# Patient Record
Sex: MALE | Employment: FULL TIME | URBAN - METROPOLITAN AREA
[De-identification: names, ages, dates, MRNs, and addresses within clinical notes are randomized per-mention and may not be internally consistent; named-entity substitution may affect disease eponyms.]

---

## 2018-07-14 ENCOUNTER — APPOINTMENT (OUTPATIENT)
Dept: MRI IMAGING | Age: 63
End: 2018-07-14
Attending: HOSPITALIST
Payer: COMMERCIAL

## 2018-07-14 ENCOUNTER — APPOINTMENT (OUTPATIENT)
Dept: CT IMAGING | Age: 63
End: 2018-07-14
Attending: EMERGENCY MEDICINE
Payer: COMMERCIAL

## 2018-07-14 ENCOUNTER — APPOINTMENT (OUTPATIENT)
Dept: GENERAL RADIOLOGY | Age: 63
End: 2018-07-14
Attending: EMERGENCY MEDICINE
Payer: COMMERCIAL

## 2018-07-14 ENCOUNTER — HOSPITAL ENCOUNTER (OUTPATIENT)
Age: 63
Setting detail: OBSERVATION
Discharge: HOME | End: 2018-07-15
Attending: EMERGENCY MEDICINE | Admitting: HOSPITALIST
Payer: COMMERCIAL

## 2018-07-14 DIAGNOSIS — H53.2 DOUBLE VISION: Primary | ICD-10-CM

## 2018-07-14 PROBLEM — I10 HYPERTENSION: Status: ACTIVE | Noted: 2018-07-14

## 2018-07-14 PROBLEM — Z85.6 HISTORY OF ACUTE MYELOID LEUKEMIA IN REMISSION: Status: ACTIVE | Noted: 2018-07-14

## 2018-07-14 PROBLEM — E78.5 HYPERLIPIDEMIA: Status: ACTIVE | Noted: 2018-07-14

## 2018-07-14 LAB
ABO + RH BLD: NORMAL
ALBUMIN SERPL-MCNC: 3.9 G/DL (ref 3.4–5)
ALBUMIN/GLOB SERPL: 1.1 {RATIO} (ref 0.8–1.7)
ALP SERPL-CCNC: 54 U/L (ref 45–117)
ALT SERPL-CCNC: 25 U/L (ref 16–61)
AMPHET UR QL SCN: NEGATIVE
ANION GAP SERPL CALC-SCNC: 9 MMOL/L (ref 3–18)
APPEARANCE UR: CLEAR
ASPIRIN TEST, ASPIRN: 627 ARU (ref 620–672)
AST SERPL-CCNC: 17 U/L (ref 15–37)
BARBITURATES UR QL SCN: NEGATIVE
BENZODIAZ UR QL: NEGATIVE
BILIRUB SERPL-MCNC: 0.6 MG/DL (ref 0.2–1)
BILIRUB UR QL: NEGATIVE
BLOOD GROUP ANTIBODIES SERPL: NORMAL
BUN SERPL-MCNC: 18 MG/DL (ref 7–18)
BUN/CREAT SERPL: 19 (ref 12–20)
CALCIUM SERPL-MCNC: 8.7 MG/DL (ref 8.5–10.1)
CANNABINOIDS UR QL SCN: NEGATIVE
CHLORIDE SERPL-SCNC: 105 MMOL/L (ref 100–108)
CHOLEST SERPL-MCNC: 312 MG/DL
CO2 SERPL-SCNC: 24 MMOL/L (ref 21–32)
COCAINE UR QL SCN: NEGATIVE
COLOR UR: YELLOW
CREAT SERPL-MCNC: 0.93 MG/DL (ref 0.6–1.3)
ERYTHROCYTE [DISTWIDTH] IN BLOOD BY AUTOMATED COUNT: 12.6 % (ref 11.6–14.5)
ERYTHROCYTE [SEDIMENTATION RATE] IN BLOOD: 13 MM/HR (ref 0–20)
EST. AVERAGE GLUCOSE BLD GHB EST-MCNC: 114 MG/DL
FIBRINOGEN PPP-MCNC: 405 MG/DL (ref 210–451)
GLOBULIN SER CALC-MCNC: 3.7 G/DL (ref 2–4)
GLUCOSE BLD STRIP.AUTO-MCNC: 121 MG/DL (ref 70–110)
GLUCOSE BLD STRIP.AUTO-MCNC: 153 MG/DL (ref 70–110)
GLUCOSE SERPL-MCNC: 116 MG/DL (ref 74–99)
GLUCOSE UR STRIP.AUTO-MCNC: NEGATIVE MG/DL
HBA1C MFR BLD: 5.6 % (ref 4.2–5.6)
HCT VFR BLD AUTO: 45.6 % (ref 36–48)
HDLC SERPL-MCNC: 67 MG/DL (ref 40–60)
HDLC SERPL: 4.7 {RATIO} (ref 0–5)
HDSCOM,HDSCOM: NORMAL
HGB BLD-MCNC: 16 G/DL (ref 13–16)
HGB UR QL STRIP: NEGATIVE
INR PPP: 1 (ref 0.8–1.2)
KETONES UR QL STRIP.AUTO: 15 MG/DL
LDLC SERPL CALC-MCNC: 212.2 MG/DL (ref 0–100)
LEUKOCYTE ESTERASE UR QL STRIP.AUTO: NEGATIVE
LIPID PROFILE,FLP: ABNORMAL
MCH RBC QN AUTO: 29.5 PG (ref 24–34)
MCHC RBC AUTO-ENTMCNC: 35.1 G/DL (ref 31–37)
MCV RBC AUTO: 84 FL (ref 74–97)
METHADONE UR QL: NEGATIVE
NITRITE UR QL STRIP.AUTO: NEGATIVE
OPIATES UR QL: NEGATIVE
PCP UR QL: NEGATIVE
PH UR STRIP: 7.5 [PH] (ref 5–8)
PLATELET # BLD AUTO: 214 K/UL (ref 135–420)
PMV BLD AUTO: 9.3 FL (ref 9.2–11.8)
POTASSIUM SERPL-SCNC: 3.5 MMOL/L (ref 3.5–5.5)
PROT SERPL-MCNC: 7.6 G/DL (ref 6.4–8.2)
PROT UR STRIP-MCNC: NEGATIVE MG/DL
PROTHROMBIN TIME: 12.5 SEC (ref 11.5–15.2)
RBC # BLD AUTO: 5.43 M/UL (ref 4.7–5.5)
SODIUM SERPL-SCNC: 138 MMOL/L (ref 136–145)
SP GR UR REFRACTOMETRY: >1.03 (ref 1–1.03)
SPECIMEN EXP DATE BLD: NORMAL
T3FREE SERPL-MCNC: 2.6 PG/ML (ref 2.18–3.98)
T4 FREE SERPL-MCNC: 1 NG/DL (ref 0.7–1.5)
THROMBIN TIME: 15.4 SECS (ref 13.8–18.2)
TRIGL SERPL-MCNC: 164 MG/DL (ref ?–150)
TSH SERPL DL<=0.05 MIU/L-ACNC: 0.66 UIU/ML (ref 0.36–3.74)
UROBILINOGEN UR QL STRIP.AUTO: 1 EU/DL (ref 0.2–1)
VLDLC SERPL CALC-MCNC: 32.8 MG/DL
WBC # BLD AUTO: 9.6 K/UL (ref 4.6–13.2)

## 2018-07-14 PROCEDURE — 85027 COMPLETE CBC AUTOMATED: CPT | Performed by: EMERGENCY MEDICINE

## 2018-07-14 PROCEDURE — 86900 BLOOD TYPING SEROLOGIC ABO: CPT | Performed by: EMERGENCY MEDICINE

## 2018-07-14 PROCEDURE — 74011250637 HC RX REV CODE- 250/637: Performed by: EMERGENCY MEDICINE

## 2018-07-14 PROCEDURE — 70553 MRI BRAIN STEM W/O & W/DYE: CPT

## 2018-07-14 PROCEDURE — 80053 COMPREHEN METABOLIC PANEL: CPT | Performed by: EMERGENCY MEDICINE

## 2018-07-14 PROCEDURE — 85670 THROMBIN TIME PLASMA: CPT | Performed by: EMERGENCY MEDICINE

## 2018-07-14 PROCEDURE — 74011250636 HC RX REV CODE- 250/636: Performed by: HOSPITALIST

## 2018-07-14 PROCEDURE — 83036 HEMOGLOBIN GLYCOSYLATED A1C: CPT | Performed by: HOSPITALIST

## 2018-07-14 PROCEDURE — 96374 THER/PROPH/DIAG INJ IV PUSH: CPT

## 2018-07-14 PROCEDURE — 96372 THER/PROPH/DIAG INJ SC/IM: CPT

## 2018-07-14 PROCEDURE — 84443 ASSAY THYROID STIM HORMONE: CPT | Performed by: HOSPITALIST

## 2018-07-14 PROCEDURE — 74011250637 HC RX REV CODE- 250/637: Performed by: HOSPITALIST

## 2018-07-14 PROCEDURE — 74011636320 HC RX REV CODE- 636/320: Performed by: EMERGENCY MEDICINE

## 2018-07-14 PROCEDURE — 85610 PROTHROMBIN TIME: CPT | Performed by: EMERGENCY MEDICINE

## 2018-07-14 PROCEDURE — 82962 GLUCOSE BLOOD TEST: CPT

## 2018-07-14 PROCEDURE — 70450 CT HEAD/BRAIN W/O DYE: CPT

## 2018-07-14 PROCEDURE — 99218 HC RM OBSERVATION: CPT

## 2018-07-14 PROCEDURE — 81003 URINALYSIS AUTO W/O SCOPE: CPT | Performed by: EMERGENCY MEDICINE

## 2018-07-14 PROCEDURE — 74011250636 HC RX REV CODE- 250/636

## 2018-07-14 PROCEDURE — 71045 X-RAY EXAM CHEST 1 VIEW: CPT

## 2018-07-14 PROCEDURE — 85576 BLOOD PLATELET AGGREGATION: CPT | Performed by: EMERGENCY MEDICINE

## 2018-07-14 PROCEDURE — A9575 INJ GADOTERATE MEGLUMI 0.1ML: HCPCS | Performed by: HOSPITALIST

## 2018-07-14 PROCEDURE — 99285 EMERGENCY DEPT VISIT HI MDM: CPT

## 2018-07-14 PROCEDURE — 80061 LIPID PANEL: CPT | Performed by: HOSPITALIST

## 2018-07-14 PROCEDURE — 84439 ASSAY OF FREE THYROXINE: CPT | Performed by: HOSPITALIST

## 2018-07-14 PROCEDURE — 74011000258 HC RX REV CODE- 258: Performed by: EMERGENCY MEDICINE

## 2018-07-14 PROCEDURE — 85384 FIBRINOGEN ACTIVITY: CPT | Performed by: EMERGENCY MEDICINE

## 2018-07-14 PROCEDURE — 70498 CT ANGIOGRAPHY NECK: CPT

## 2018-07-14 PROCEDURE — 85652 RBC SED RATE AUTOMATED: CPT | Performed by: HOSPITALIST

## 2018-07-14 PROCEDURE — 84481 FREE ASSAY (FT-3): CPT | Performed by: HOSPITALIST

## 2018-07-14 PROCEDURE — 80307 DRUG TEST PRSMV CHEM ANLYZR: CPT | Performed by: EMERGENCY MEDICINE

## 2018-07-14 PROCEDURE — 93005 ELECTROCARDIOGRAM TRACING: CPT

## 2018-07-14 PROCEDURE — 86141 C-REACTIVE PROTEIN HS: CPT | Performed by: HOSPITALIST

## 2018-07-14 RX ORDER — LISINOPRIL 10 MG/1
TABLET ORAL DAILY
COMMUNITY

## 2018-07-14 RX ORDER — AMOXICILLIN 250 MG
2 CAPSULE ORAL
Status: DISCONTINUED | OUTPATIENT
Start: 2018-07-14 | End: 2018-07-15 | Stop reason: HOSPADM

## 2018-07-14 RX ORDER — ATORVASTATIN CALCIUM 20 MG/1
40 TABLET, FILM COATED ORAL
Status: DISCONTINUED | OUTPATIENT
Start: 2018-07-14 | End: 2018-07-15

## 2018-07-14 RX ORDER — ATORVASTATIN CALCIUM 40 MG/1
40 TABLET, FILM COATED ORAL
COMMUNITY
End: 2018-07-15

## 2018-07-14 RX ORDER — ONDANSETRON 2 MG/ML
INJECTION INTRAMUSCULAR; INTRAVENOUS
Status: COMPLETED
Start: 2018-07-14 | End: 2018-07-14

## 2018-07-14 RX ORDER — ACETAMINOPHEN 650 MG/1
650 SUPPOSITORY RECTAL
Status: DISCONTINUED | OUTPATIENT
Start: 2018-07-14 | End: 2018-07-15 | Stop reason: HOSPADM

## 2018-07-14 RX ORDER — ATORVASTATIN CALCIUM 10 MG/1
TABLET, FILM COATED ORAL DAILY
COMMUNITY
End: 2018-07-14

## 2018-07-14 RX ORDER — ONDANSETRON 2 MG/ML
4 INJECTION INTRAMUSCULAR; INTRAVENOUS
Status: DISCONTINUED | OUTPATIENT
Start: 2018-07-14 | End: 2018-07-15 | Stop reason: HOSPADM

## 2018-07-14 RX ORDER — ENOXAPARIN SODIUM 100 MG/ML
40 INJECTION SUBCUTANEOUS EVERY 24 HOURS
Status: DISCONTINUED | OUTPATIENT
Start: 2018-07-14 | End: 2018-07-15 | Stop reason: HOSPADM

## 2018-07-14 RX ORDER — ACETAMINOPHEN 325 MG/1
650 TABLET ORAL
Status: DISCONTINUED | OUTPATIENT
Start: 2018-07-14 | End: 2018-07-15 | Stop reason: HOSPADM

## 2018-07-14 RX ORDER — GADOTERATE MEGLUMINE 376.9 MG/ML
15 INJECTION INTRAVENOUS
Status: COMPLETED | OUTPATIENT
Start: 2018-07-14 | End: 2018-07-14

## 2018-07-14 RX ORDER — ALBUTEROL SULFATE 0.83 MG/ML
2.5 SOLUTION RESPIRATORY (INHALATION)
Status: DISCONTINUED | OUTPATIENT
Start: 2018-07-14 | End: 2018-07-15 | Stop reason: HOSPADM

## 2018-07-14 RX ORDER — GUAIFENESIN 100 MG/5ML
81 LIQUID (ML) ORAL
Status: COMPLETED | OUTPATIENT
Start: 2018-07-14 | End: 2018-07-14

## 2018-07-14 RX ORDER — ASPIRIN 325 MG
325 TABLET ORAL DAILY
Status: DISCONTINUED | OUTPATIENT
Start: 2018-07-15 | End: 2018-07-15 | Stop reason: HOSPADM

## 2018-07-14 RX ORDER — SODIUM CHLORIDE 9 MG/ML
100 INJECTION, SOLUTION INTRAVENOUS
Status: COMPLETED | OUTPATIENT
Start: 2018-07-14 | End: 2018-07-14

## 2018-07-14 RX ADMIN — SODIUM CHLORIDE 95 ML: 900 INJECTION, SOLUTION INTRAVENOUS at 18:07

## 2018-07-14 RX ADMIN — ASPIRIN 81 MG 81 MG: 81 TABLET ORAL at 17:44

## 2018-07-14 RX ADMIN — IOPAMIDOL 69 ML: 755 INJECTION, SOLUTION INTRAVENOUS at 18:07

## 2018-07-14 RX ADMIN — ATORVASTATIN CALCIUM 40 MG: 20 TABLET, FILM COATED ORAL at 22:11

## 2018-07-14 RX ADMIN — GADOTERATE MEGLUMINE 15 ML: 376.9 INJECTION INTRAVENOUS at 19:40

## 2018-07-14 RX ADMIN — ONDANSETRON 4 MG: 2 INJECTION, SOLUTION INTRAMUSCULAR; INTRAVENOUS at 17:25

## 2018-07-14 RX ADMIN — ENOXAPARIN SODIUM 40 MG: 100 INJECTION SUBCUTANEOUS at 22:11

## 2018-07-14 NOTE — IP AVS SNAPSHOT
[address] Patient: Giselle Moctezuma MRN: [id number] [facility]:[birth year] About your hospitalization You were admitted on:  July 14, 2018 You last received care in the:  [facility] 2E GEN SURG You were discharged on:  July 15, 2018 Why you were hospitalized Your primary diagnosis was:  Binocular Vision Disorder With Diplopia Your diagnoses also included:  History Of Acute Myeloid Leukemia In Remission, Hypertension, Hyperlipidemia Follow-up Information Follow up With Details Comments Contact Info Emely Garcia MD  follow up with PCP within 3-5 days to discuss hospitalization [facility] [address] [facility] [id number] 
[phone] Migue Souza MD In 1 week follow up with Neurology as outpatient [address] [facility] [address] [phone] 
  
   follow up with Opthomology as outpatient to discuss diplopia Discharge Orders None A check gerber indicates which time of day the medication should be taken. My Medications START taking these medications Instructions Each Dose to Equal  
 Morning Noon Evening Bedtime  
 aspirin 325 mg tablet Commonly known as:  ASPIRIN Start taking on:  7/16/2018 Your last dose was: Your next dose is: Take 1 Tab by mouth daily. 325 mg CHANGE how you take these medications Instructions Each Dose to Equal  
 Morning Noon Evening Bedtime  
 atorvastatin 80 mg tablet Commonly known as:  LIPITOR What changed:   
- medication strength 
- how much to take Your last dose was: Your next dose is: Take 1 Tab by mouth nightly. 80 mg CONTINUE taking these medications Instructions Each Dose to Equal  
 Morning Noon Evening Bedtime  
 lisinopril 10 mg tablet Commonly known as:  Leannamir Nunu Your last dose was: Your next dose is: Take  by mouth daily. Indications: Pt and wife do not know dose Where to Get Your Medications Information on where to get these meds will be given to you by the nurse or doctor. ! Ask your nurse or doctor about these medications  
  aspirin 325 mg tablet  
 atorvastatin 80 mg tablet Discharge Instructions DISCHARGE SUMMARY from Nurse PATIENT INSTRUCTIONS: 
 
 
F-face looks uneven A-arms unable to move or move unevenly S-speech slurred or non-existent T-time-call 911 as soon as signs and symptoms begin-DO NOT go Back to bed or wait to see if you get better-TIME IS BRAIN. Warning Signs of HEART ATTACK Call 911 if you have these symptoms: 
? Chest discomfort. Most heart attacks involve discomfort in the center of the chest that lasts more than a few minutes, or that goes away and comes back. It can feel like uncomfortable pressure, squeezing, fullness, or pain. ? Discomfort in other areas of the upper body. Symptoms can include pain or discomfort in one or both arms, the back, neck, jaw, or stomach. ? Shortness of breath with or without chest discomfort. ? Other signs may include breaking out in a cold sweat, nausea, or lightheadedness. Don't wait more than five minutes to call [address]! Fast action can save your life. Calling 911 is almost always the fastest way to get lifesaving treatment. Emergency Medical Services staff can begin treatment when they arrive  up to an hour sooner than if someone gets to the hospital by car. The discharge information has been reviewed with the patient. The patient verbalized understanding. Discharge medications reviewed with the patient and appropriate educational materials and side effects teaching were provided. ___________________________________________________________________________________________________________________________________ Double Vision: Care Instructions Your Care Instructions Double vision means seeing two images instead of one. To see normally, your eyes, the muscles that move them, the nerves that send images to your brain, and your brain all have to work together. A problem with any of these parts can cause double vision. Double vision can occur in one or both eyes. It can be horizontal (so the images appear side by side) or vertical (so one image appears above the other). Double vision may be caused by a muscle or nerve problem. Or it may be caused by an eye problem such as a cataract or by a brain problem such as a stroke. When the cause is found, double vision can usually be corrected. To find the cause, you may need tests. These may include blood tests and imaging tests such as MRI. You may need to follow up with an eye doctor or a brain specialist (neurologist) for more testing or treatment. The doctor has checked you carefully, but problems can develop later. If you notice any problems or new symptoms, get medical treatment right away. Follow-up care is a key part of your treatment and safety. Be sure to make and go to all appointments, and call your doctor if you are having problems. It's also a good idea to know your test results and keep a list of the medicines you take. How can you care for yourself at home? · Do not drive or do other things that could be dangerous because of your double vision. · Make your home safe to help prevent injuries. For example, remove throw rugs and electrical cords that could cause falls.  Be extra careful when you work with sharp tools or knives. · Ask another adult to stay with you until your vision gets better. When should you call for help? Call 911 anytime you think you may need emergency care. For example, call if: 
· You have symptoms of a stroke. These may include: 
¨ Sudden numbness, tingling, weakness, or loss of movement in your face, arm, or leg, especially on only one side of your body. ¨ Sudden vision changes. ¨ Sudden trouble speaking. ¨ Sudden confusion or trouble understanding simple statements. ¨ Sudden problems with walking or balance. ¨ A sudden, severe headache that is different from past headaches. Call your doctor now or seek immediate medical care if: 
· You have new or worse eye pain. · You have new or worse redness in your eye. · Light hurts your eye. Watch closely for changes in your health, and be sure to contact your doctor if: 
· You do not get better as expected. Where can you learn more? Go to StreetÂ LibraryÂ Network.be Enter I740 in the search box to learn more about \"Double Vision: Care Instructions. \"  
© 5825-7574 Healthwise, Incorporated. Care instructions adapted under license by [employer] (which disclaims liability or warranty for this information). This care instruction is for use with your licensed healthcare professional. If you have questions about a medical condition or this instruction, always ask your healthcare professional. [place] [address] any warranty or liability for your use of this information. Content Version: 48.0.180360; Current as of: August 21, 2015 Patient armband removed and shredded [employer] Announcement We are excited to announce that we are making your provider's discharge notes available to you in [employer]. You will see these notes when they are completed and signed by the physician that discharged you from your recent hospital stay.   If you have any questions or concerns about any information you see in [email or web address].com, please call the Health Information Department where you were seen or reach out to your Primary Care Provider for more information about your plan of care. Introducing [facility]! Nathaly Donahue introduces ZEEF.com patient portal. Now you can access parts of your medical record, email your doctor's office, and request medication refills online. 1. In your internet browser, go to [email or web address] 2. Click on the First Time User? Click Here link in the Sign In box. You will see the New Member Sign Up page. 3. Enter your ZEEF.com Access Code exactly as it appears below. You will not need to use this code after youve completed the sign-up process. If you do not sign up before the expiration date, you must request a new code. · ZEEF.com Access Code: [email or web address] Expires: 10/12/2018  4:52 PM 
 
4. Enter the last four digits of your Social Security Number (xxxx) and Date of Birth (mm/dd/yyyy) as indicated and click Submit. You will be taken to the next sign-up page. 5. Create a ZEEF.com ID. This will be your ZEEF.com login ID and cannot be changed, so think of one that is secure and easy to remember. 6. Create a ZEEF.com password. You can change your password at any time. 7. Enter your Password Reset Question and Answer. This can be used at a later time if you forget your password. 8. Enter your e-mail address. You will receive e-mail notification when new information is available in [address] 9. Click Sign Up. You can now view and download portions of your medical record. 10. Click the Download Summary menu link to download a portable copy of your medical information. If you have questions, please visit the Frequently Asked Questions section of the ZEEF.com website. Remember, ZEEF.com is NOT to be used for urgent needs. For medical emergencies, dial 911. Now available from your iPhone and Android! Introducing Rigo Urena As a [employer] patient, I wanted to make you aware of our electronic visit tool called Rigo Urena. [employer] 24/7 allows you to connect within minutes with a medical provider 24 hours a day, seven days a week via a mobile device or tablet or logging into a secure website from your computer. You can access Rigo Rasmussenfin from anywhere in the [place]. A virtual visit might be right for you when you have a simple condition and feel like you just dont want to get out of bed, or cant get away from work for an appointment, when your regular [employer] provider is not available (evenings, weekends or holidays), or when youre out of town and need minor care. Electronic visits cost only $49 and if the [employer] 24/7 provider determines a prescription is needed to treat your condition, one can be electronically transmitted to a nearby pharmacy*. Please take a moment to enroll today if you have not already done so. The enrollment process is free and takes just a few minutes. To enroll, please download the [employer] 24/7 lizzette to your tablet or phone, or visit [email or web address] to enroll on your computer. And, as an [address] [place] [address] patient with a S[employer]ot account, the results of your visits will be scanned into your electronic medical record and your primary care provider will be able to view the scanned results. We urge you to continue to see your regular [employer] provider for your ongoing medical care. And while your primary care provider may not be the one available when you seek a Rigo Mcbrideopalfin virtual visit, the peace of mind you get from getting a real diagnosis real time can be priceless. For more information on Rigo Mcbrideopalfin, view our Frequently Asked Questions (FAQs) at [email or web address]. Sincerely, 
 
Lauren Sandoval MD 
Chief Medical Officer Saba Ross *:  certain medications cannot be prescribed via Rigo Urena Unresulted Labs-Please follow up with your PCP about these lab tests Order Current Status CRP, HIGH SENSITIVITY In process CTA HEAD NECK W CONT In process MRI BRAIN W WO CONT In process Providers Seen During Your Hospitalization Provider Specialty Primary office phone Henna Raya MD Emergency Medicine [phone] Mike Will MD Emergency Medicine [phone] Denney Duane, DO Internal Medicine [phone] Your Primary Care Physician (PCP) Primary Care Physician Office Phone Office Fax [address] [facility], [address] [place] [address] [phone] You are allergic to the following No active allergies Recent Documentation Height Weight BMI  
  
  
 1.753 m 83.9 kg 27.32 kg/m2 Emergency Contacts Name Discharge Info Relation Home Work Mobile Violet Bourgeois DISCHARGE CAREGIVER [3] Spouse [3] [phone] Patient Belongings The following personal items are in your possession at time of discharge: 
                Jewelry: Earrings, Sent home  Clothing: Pants, Shirt, Other (comment) Please provide this summary of care documentation to your next provider. Signatures-by signing, you are acknowledging that this After Visit Summary has been reviewed with you and you have received a copy. Patient Signature:  ____________________________________________________________ Date:  ____________________________________________________________  
  
Sam Gregorio Provider Signature:  ____________________________________________________________ Date:  ____________________________________________________________

## 2018-07-14 NOTE — ED NOTES
Pt began to have symptoms at midnight when waking up to go to the bathroom. He felt lightheaded and experienced double vision.

## 2018-07-14 NOTE — ED PROVIDER NOTES
EMERGENCY DEPARTMENT HISTORY AND PHYSICAL EXAM    4:57 PM      Date: 7/14/2018  Patient Name: Marcela Forrest    History of Presenting Illness     Chief Complaint   Patient presents with    Dizziness    Double Vision         History Provided By: Patient and Patient's Wife    Chief Complaint: Dizziness   Duration:  Last known normal 12AM Today   Timing:  Intermittent and Worsening  Location: N/A  Quality: N/A  Severity: N/A  Modifying Factors: None   Associated Symptoms: Associated symptoms include double vision, diaphoresis, and nausea. Patient denies other associated symptoms, such as headache, weakness, and speech difficulty. Additional History (Context): Marcela Forrest is a 58 y.o. male with No significant past medical history who presents with Dizziness onset Today 12AM. Patient's wife states that he got up at [address] [facility] Today to go to the bathroom. When he returned to the bedroom, he was complaining of dizziness. Associated symptoms include double vision, diaphoresis, and nausea. Patient denies other associated symptoms, such as headache, weakness, and speech difficulty. Denies any prior head injuries. Reports that he has a history of Kaleidoscope vision that has been progressive over the last few years. Patient denies any prior hx of this current presentation. No other concerns were expressed at this time. Code S Level 1 was called at 16:42PM. Code S Level 2 was called at 16:44PM.      As the patient is without physical symptoms or complaints of pain, there is no location of pain, severity of pain, quality of pain, or modifying factors regarding the pt's presenting complaint. PCP: Chapis Jenkins MD        Past History     Past Medical History:  No past medical history on file. Past Surgical History:  No past surgical history on file. Family History:  No family history on file.     Social History:  Social History   Substance Use Topics    Smoking status: Not on file    Smokeless tobacco: Not on file    Alcohol use Not on file       Allergies:  No Known Allergies      Review of Systems     Review of Systems   Constitutional: Positive for diaphoresis. Eyes: Positive for visual disturbance (Double Vision ). Gastrointestinal: Positive for nausea. Neurological: Positive for dizziness. Negative for speech difficulty, weakness and headaches. All other systems reviewed and are negative. Physical Exam     Visit Vitals    BP (!) 165/100    Pulse 71    Resp 14    SpO2 98%       Physical Exam   Constitutional: He is oriented to person, place, and time. He appears well-developed and well-nourished. No distress. HENT:   Head: Normocephalic and atraumatic. Mouth/Throat: Oropharynx is clear and moist.   Eyes: Conjunctivae are normal. Pupils are equal, round, and reactive to light. No scleral icterus. Disconjugate Gaze    Neck: Normal range of motion. Neck supple. Cardiovascular: Normal rate, regular rhythm and normal heart sounds. No murmur heard. Pulmonary/Chest: Effort normal and breath sounds normal. No respiratory distress. Abdominal: Soft. Bowel sounds are normal. He exhibits no distension. There is no tenderness. Musculoskeletal: He exhibits no edema. Lymphadenopathy:     He has no cervical adenopathy. Neurological: He is alert and oriented to person, place, and time. Coordination normal.   Skin: Skin is warm and dry. No rash noted. Psychiatric: He has a normal mood and affect. His behavior is normal.   Nursing note and vitals reviewed.         Diagnostic Study Results     Labs -  Recent Results (from the past 12 hour(s))   CBC W/O DIFF    Collection Time: 07/14/18  4:45 PM   Result Value Ref Range    WBC 9.6 4.6 - 13.2 K/uL    RBC 5.43 4.70 - 5.50 M/uL    HGB 16.0 13.0 - 16.0 g/dL    HCT 45.6 36.0 - 48.0 %    MCV 84.0 74.0 - 97.0 FL    MCH 29.5 24.0 - 34.0 PG    MCHC 35.1 31.0 - 37.0 g/dL    RDW 12.6 11.6 - 14.5 %    PLATELET 688 841 - 831 K/uL    MPV 9.3 9.2 - 49.3 FL   METABOLIC PANEL, COMPREHENSIVE    Collection Time: 07/14/18  4:45 PM   Result Value Ref Range    Sodium 138 136 - 145 mmol/L    Potassium 3.5 3.5 - 5.5 mmol/L    Chloride 105 100 - 108 mmol/L    CO2 24 21 - 32 mmol/L    Anion gap 9 3.0 - 18 mmol/L    Glucose 116 (H) 74 - 99 mg/dL    BUN 18 7.0 - 18 MG/DL    Creatinine 0.93 0.6 - 1.3 MG/DL    BUN/Creatinine ratio 19 12 - 20      GFR est AA >60 >60 ml/min/1.73m2    GFR est non-AA >60 >60 ml/min/1.73m2    Calcium 8.7 8.5 - 10.1 MG/DL    Bilirubin, total 0.6 0.2 - 1.0 MG/DL    ALT (SGPT) 25 16 - 61 U/L    AST (SGOT) 17 15 - 37 U/L    Alk.  phosphatase 54 45 - 117 U/L    Protein, total 7.6 6.4 - 8.2 g/dL    Albumin 3.9 3.4 - 5.0 g/dL    Globulin 3.7 2.0 - 4.0 g/dL    A-G Ratio 1.1 0.8 - 1.7     PROTHROMBIN TIME + INR    Collection Time: 07/14/18  4:45 PM   Result Value Ref Range    Prothrombin time 12.5 11.5 - 15.2 sec    INR 1.0 0.8 - 1.2     THROMBIN TIME    Collection Time: 07/14/18  4:45 PM   Result Value Ref Range    Thrombin time 15.4 13.8 - 18.2 SECS   FIBRINOGEN    Collection Time: 07/14/18  4:45 PM   Result Value Ref Range    Fibrinogen 405 210 - 451 mg/dL   TYPE & SCREEN    Collection Time: 07/14/18  4:45 PM   Result Value Ref Range    Crossmatch Expiration 07/17/2018     ABO/Rh(D) PENDING     Antibody screen PENDING    ASPIRIN TEST    Collection Time: 07/14/18  4:45 PM   Result Value Ref Range    Aspirin test 627 620 - 672 ARU   EKG, 12 LEAD, INITIAL    Collection Time: 07/14/18  5:09 PM   Result Value Ref Range    Ventricular Rate 74 BPM    Atrial Rate 74 BPM    P-R Interval 176 ms    QRS Duration 96 ms    Q-T Interval 402 ms    QTC Calculation (Bezet) 446 ms    Calculated P Axis 47 degrees    Calculated R Axis 24 degrees    Calculated T Axis 6 degrees    Diagnosis       Normal sinus rhythm  Normal ECG  No previous ECGs available     GLUCOSE, POC    Collection Time: 07/14/18  5:23 PM   Result Value Ref Range    Glucose (POC) 121 (H) 70 - 110 mg/dL       Radiologic Studies -   CT HEAD WO CONT   Final Result      XR CHEST PORT    (Results Pending)   MRA BRAIN WO CONT    (Results Pending)   MRA NECK WO CONT    (Results Pending)   MRI BRAIN W WO CONT    (Results Pending)         Medical Decision Making   I am the first provider for this patient. I reviewed the vital signs, available nursing notes, past medical history, past surgical history, family history and social history. Vital Signs-Reviewed the patient's vital signs. Pulse Oximetry Analysis -  99% on room air (Interpretation)    Cardiac Monitor:  Rate:  77bpm  Rhythm:  Normal Sinus Rhythm     EKG: Interpreted by the EP. Time Interpreted: 17:09PM   Rate: 74bpm   Rhythm: Normal Sinus Rhythm    Interpretation: No acute ST/T-wave changes    Comparison: No previous EKGs available    Records Reviewed: Nursing Notes (Time of Review: 4:57 PM)    ED Course: Progress Notes, Reevaluation, and Consults:  Consult:  Discussed care with Dr. Rayne Poole, Teleneurology Standard discussion; including history of patients chief complaint, available diagnostic results, and treatment course. Will come and see the patient.  5:02 PM    5:11 PM  Patient had a large episode of emesis upon returning from CT.     5:14 PM  I received a call from Radiology, who stated that the patient's CT Head is negative. 5:33 PM  Teleneurology is at the patient's bedside. Consult:  Discussed care with Dr. Rayne Poole, Teleneurology Standard discussion; including history of patients chief complaint, available diagnostic results, and treatment course. Recommends CTA Head and Neck today, and then MRI W/WO CONT.  5:42 PM    Consult:  Discussed care with Dr. Alec Juarez, Hospitalist Standard discussion; including history of patients chief complaint, available diagnostic results, and treatment course. Accepts the patient for admission  6:00 PM    6:02 PM : Pt care transferred to Dr. Boone Dennison ,ED provider.  History of patient complaint(s), available diagnostic reports and current treatment plan has been discussed thoroughly. Bedside rounding on patient occured : no . Intended disposition of patient : ADMIT  Pending diagnostics reports and/or labs (please list): Pending CTA and MRI for Dry vision       Provider Notes (Medical Decision Making):   MDM  Number of Diagnoses or Management Options  Double vision:   Diagnosis management comments: Last time normal approx midnight last night with double vision horizontal h/o \"'kaleidoscope vision\" for the past few months remainder exam intact     Ct neg asa given CTA head and neck pending, MRI brain pending discussed with Dr Jorge L Hough and Dr Ruffin Sites for admission and ct results        Amount and/or Complexity of Data Reviewed  Clinical lab tests: ordered and reviewed  Tests in the radiology section of CPT®: ordered  Independent visualization of images, tracings, or specimens: yes    Risk of Complications, Morbidity, and/or Mortality  Presenting problems: high  Diagnostic procedures: moderate  Management options: moderate        CRITICAL CARE:  6:12 PM  I have spent 30 minutes of critical care time involved in lab review, consultations with specialist, family decision-making, and documentation. During this entire length of time I was immediately available to the patient. Critical Care: The reason for providing this level of medical care for this critically ill patient was due a critical illness that impaired one or more vital organ systems such that there was a high probability of imminent or life threatening deterioration in the patients condition. This care involved high complexity decision making to assess, manipulate, and support vital system functions, to treat this degreee vital organ system failure and to prevent further life threatening deterioration of the patients condition. For Hospitalized Patients:    1.  Hospitalization Decision Time:  The decision to hospitalize the patient was made by Dr. Fernandez Shea at 17:49PM on 7/14/2018    2. Aspirin: Aspirin was given    Diagnosis     Clinical Impression:   1. Double vision        Disposition: Admission, Pending CTA and MRI for dry vision    Follow-up Information     None           Patient's Medications    No medications on file     _______________________________    Attestations:  Bello Marinelli [id number] acting as a scribe for and in the presence of Stormy Luis MD      July 14, 2018 at 4:57 PM       Provider Attestation:      I personally performed the services described in the documentation, reviewed the documentation, as recorded by the scribe in my presence, and it accurately and completely records my words and actions.  July 14, 2018 at 4:57 PM - Stormy Luis MD    _______________________________

## 2018-07-14 NOTE — IP AVS SNAPSHOT
[address] Patient: Eugene Peterson MRN: [id number] GPY:4/83/1540 A check gerber indicates which time of day the medication should be taken. My Medications START taking these medications Instructions Each Dose to Equal  
 Morning Noon Evening Bedtime  
 aspirin 325 mg tablet Commonly known as:  ASPIRIN Start taking on:  7/16/2018 Your last dose was: Your next dose is: Take 1 Tab by mouth daily. 325 mg CHANGE how you take these medications Instructions Each Dose to Equal  
 Morning Noon Evening Bedtime  
 atorvastatin 80 mg tablet Commonly known as:  LIPITOR What changed:   
- medication strength 
- how much to take Your last dose was: Your next dose is: Take 1 Tab by mouth nightly. 80 mg CONTINUE taking these medications Instructions Each Dose to Equal  
 Morning Noon Evening Bedtime  
 lisinopril 10 mg tablet Commonly known as:  Kavitha November Your last dose was: Your next dose is: Take  by mouth daily. Indications: Pt and wife do not know dose Where to Get Your Medications Information on where to get these meds will be given to you by the nurse or doctor. ! Ask your nurse or doctor about these medications  
  aspirin 325 mg tablet  
 atorvastatin 80 mg tablet

## 2018-07-14 NOTE — ED TRIAGE NOTES
Patient diaphoretic, stated he is seeing double, patient has unsteady gait and reported dizziness. Patient said he started feeling weird yesterday and got worst today.

## 2018-07-14 NOTE — ED NOTES
TRANSFER - ED to INPATIENT REPORT:    SBAR report made available to receiving floor on this [facility]  for [facility] of care       Admitting diagnosis Binocular vision disorder with diplopia    Information from the following report(s) SBAR, ED Summary and MAR was made available to receiving floor. Lines:   Peripheral IV 07/14/18 Right Antecubital (Active)   Site Assessment Clean, dry, & intact 7/14/2018  4:45 PM   Phlebitis Assessment 0 7/14/2018  4:45 PM   Infiltration Assessment 0 7/14/2018  4:45 PM   Dressing Status Clean, dry, & intact 7/14/2018  4:45 PM   Dressing Type Transparent 7/14/2018  4:45 PM        Medication list unable to confirm    Opportunity for questions and clarification was provided.       Patient is oriented to time, place, person and situation   Patient is  continent and ambulatory without assist     Valuables transported with patient     Patient transported with:   Monitor

## 2018-07-14 NOTE — ED NOTES
Called to 2400 spoke to All. Informed the unit that pt just left for MRI and will be delayed coming to the floor.

## 2018-07-14 NOTE — H&P
Internal Medicine History and Physical 
   
 
 
Subjective HPI: Vicki Solano is a 58 y.o. male with a PMHx of acute promyelocytic leukemia now in remission, HTN, HLD who presented to the ED with the acute onset of horizontal diplopia w/ associated nausea, vomiting and dizziness. The patient states he woke up to the symptoms this morning at around midnight when he got up to go to the bathroom. This was followed by dizziness. He ended up trying to lie down most of the day but the symptoms persisted long enough to lead him to the ED. He also reports a history of Kaleidoscope vision that has been progressive over the last few years. In the ED, a code S was called and CT head was completed. Results were negative. CTA head/neck and MRI w/o are currently pending. Tele-neurology was consulted and recommended at least observation for continued work up. On the way back from CT he became nauseous and vomited. He states that the diplopia would disappear when either eye was closed. He denied any ocular pain. Currently, he is feeling OK and no longer having symptoms, but he admits that it has waxed and waned. He denies any history of stroke. PMHx: 
Acute promyelocytic leukemia PSurgHx: 
No past surgical history on file. SocialHx: 
Social History Social History  Marital status:  Spouse name: N/A  
 Number of children: N/A  
 Years of education: N/A Occupational History  Not on file. Social History Main Topics  Smoking status: Denies  Smokeless tobacco: Denies  Alcohol use Denies  Drug use: Denies  Sexual activity: Not on file Other Topics Concern  Not on file Social History Narrative FamilyHx: 
No family history on file. None Review of Systems: 
CONST: Fever, weight loss, fatigue or chills HEENT: Recent changes in vision, vertigo, epistaxis, dysphagia and hoarseness CV: Chest pain, palpitations, edema and varicosities RESP: Cough, shortness of breath, wheezing, hemoptysis, snoring and reactive airway disease GI: Nausea, vomiting, abdominal pain, change in bowel habits, hematochezia, melena, and GERD  
: Hematuria, dysuria, frequency, urgency, nocturia and stress urinary incontinence MS: Weakness, joint pain and arthritis ENDO: Polyuria, polydipsia, polyphagia, poor wound healing, heat intolerance, cold intolerance LYMPH/HEME: Anemia, bruising and history of blood transfusions INTEG: Dermatitis, abnormal moles NEURO: Dizziness, headache, fainting, seizures and stroke PSYCH: Anxiety and depression Objective Visit Vitals  BP (!) 144/91  Pulse 66  Resp 11  SpO2 98% Physical Exam: 
General Appearance: NAD, conversant HENT: normocephalic/atraumatic, moist mucus membranes Lungs: CTA with normal respiratory effort Cardiovascular: RRR, no m/r/g, capillary refill < 2 sec, B/L DP/PT pulses +3/4 Abdomen: soft, non-tender, normal bowel sounds Extremities: no cyanosis, no peripheral edema Neuro: moves all extremities, no focal deficits Psych: appropriate affect, alert and oriented to person, place and time Laboratory Studies: 
CMP:  
Lab Results Component Value Date/Time  07/14/2018 04:45 PM  
 K 3.5 07/14/2018 04:45 PM  
  07/14/2018 04:45 PM  
 CO2 24 07/14/2018 04:45 PM  
 AGAP 9 07/14/2018 04:45 PM  
  (H) 07/14/2018 04:45 PM  
 BUN 18 07/14/2018 04:45 PM  
 CREA 0.93 07/14/2018 04:45 PM  
 GFRAA >60 07/14/2018 04:45 PM  
 GFRNA >60 07/14/2018 04:45 PM  
 CA 8.7 07/14/2018 04:45 PM  
 ALB 3.9 07/14/2018 04:45 PM  
 TP 7.6 07/14/2018 04:45 PM  
 GLOB 3.7 07/14/2018 04:45 PM  
 AGRAT 1.1 07/14/2018 04:45 PM  
 SGOT 17 07/14/2018 04:45 PM  
 ALT 25 07/14/2018 04:45 PM  
 
CBC:  
Lab Results Component Value Date/Time  WBC 9.6 07/14/2018 04:45 PM  
 HGB 16.0 07/14/2018 04:45 PM  
 HCT 45.6 07/14/2018 04:45 PM  
  07/14/2018 04:45 PM  
 
All Cardiac Markers in the last 24 hours: No results found for: CPK, CK, CKMMB, CKMB, RCK3, CKMBT, CKNDX, CKND1, MICHAEL, TROPT, TROIQ, MICHEAL, TROPT, TNIPOC, BNP, BNPP Imaging Reviewed: 
Ct Head Wo Cont Result Date: 7/14/2018 EXAM: CT head INDICATION: Code S , Level 2, blurred vision and dizziness with onset at noon today. COMPARISON: None. TECHNIQUE: Axial CT imaging of the head was performed without intravenous contrast. One or more dose reduction techniques were used on this CT: automated exposure control, adjustment of the mAs and/or kVp according to patient's size, and iterative reconstruction techniques. The specific techniques utilized on this CT exam have been documented in the patient's electronic medical record. _______________ FINDINGS: BRAIN AND POSTERIOR FOSSA: The sulci, folia, ventricles and basal cisterns are within normal limits for the patient?s age. There is no intracranial hemorrhage, mass effect, or midline shift. There are no areas of abnormal parenchymal attenuation. EXTRA-AXIAL SPACES AND MENINGES: There are no abnormal extra-axial fluid collections. CALVARIUM: Intact. SINUSES: Clear. OTHER: None. _______________ IMPRESSION: 1. No CT findings of an acute intracranial abnormality. Please note that noncontrast head CT may be normal in early acute infarct. Code S report provided to ordering physician Jesus Ozuna at  on 07/14/18, with confirmatory verbal response. Assessment/Plan Active Hospital Problems Diagnosis Date Noted  History of acute myeloid leukemia in remission 07/14/2018  Binocular vision disorder with diplopia 07/14/2018  Hypertension 07/14/2018  Hyperlipidemia 07/14/2018  
 
- CTA head/neck negative for any large vessel occlusion or aneurysm - MRI with and w/o with 10 cuts through orbits 
- Neuro checks - Fall precautions - Zofran PRN 
- Echo if evidence of stroke - Check lipid panel, A1c 
- Permissive HTN until results of imaging back 
- Consult local neuro though likely can f/u outpt if imaging is neg 
- Cont acceptable home medications for chronic conditions  
- DVT protocol I have personally reviewed all pertinent labs, films and EKGs that have officially resulted. I reviewed available electronic documentation outlining the initial presentation as well as the emergency room physician's encounter. Hortencia Jimenez, DO Internal Medicine, Hospitalist 
Pager: [phone] [facility]

## 2018-07-15 VITALS
WEIGHT: 185 LBS | OXYGEN SATURATION: 96 % | RESPIRATION RATE: 16 BRPM | DIASTOLIC BLOOD PRESSURE: 72 MMHG | HEART RATE: 75 BPM | SYSTOLIC BLOOD PRESSURE: 130 MMHG | TEMPERATURE: 98.3 F | BODY MASS INDEX: 27.4 KG/M2 | HEIGHT: 69 IN

## 2018-07-15 LAB
ATRIAL RATE: 74 BPM
CALCULATED P AXIS, ECG09: 47 DEGREES
CALCULATED R AXIS, ECG10: 24 DEGREES
CALCULATED T AXIS, ECG11: 6 DEGREES
DIAGNOSIS, 93000: NORMAL
GLUCOSE BLD STRIP.AUTO-MCNC: 104 MG/DL (ref 70–110)
GLUCOSE BLD STRIP.AUTO-MCNC: 120 MG/DL (ref 70–110)
GLUCOSE BLD STRIP.AUTO-MCNC: 133 MG/DL (ref 70–110)
P-R INTERVAL, ECG05: 176 MS
Q-T INTERVAL, ECG07: 402 MS
QRS DURATION, ECG06: 96 MS
QTC CALCULATION (BEZET), ECG08: 446 MS
VENTRICULAR RATE, ECG03: 74 BPM

## 2018-07-15 PROCEDURE — 97530 THERAPEUTIC ACTIVITIES: CPT

## 2018-07-15 PROCEDURE — 99218 HC RM OBSERVATION: CPT

## 2018-07-15 PROCEDURE — 97161 PT EVAL LOW COMPLEX 20 MIN: CPT

## 2018-07-15 PROCEDURE — 82962 GLUCOSE BLOOD TEST: CPT

## 2018-07-15 PROCEDURE — 74011250637 HC RX REV CODE- 250/637: Performed by: HOSPITALIST

## 2018-07-15 RX ORDER — ATORVASTATIN CALCIUM 80 MG/1
80 TABLET, FILM COATED ORAL
Qty: 30 TAB | Refills: 0 | Status: SHIPPED | OUTPATIENT
Start: 2018-07-15

## 2018-07-15 RX ORDER — ATORVASTATIN CALCIUM 20 MG/1
80 TABLET, FILM COATED ORAL
Status: DISCONTINUED | OUTPATIENT
Start: 2018-07-15 | End: 2018-07-15 | Stop reason: HOSPADM

## 2018-07-15 RX ORDER — ASPIRIN 325 MG
325 TABLET ORAL DAILY
Qty: 30 TAB | Refills: 0 | Status: SHIPPED | OUTPATIENT
Start: 2018-07-16

## 2018-07-15 RX ADMIN — ASPIRIN 325 MG ORAL TABLET 325 MG: 325 PILL ORAL at 08:30

## 2018-07-15 NOTE — H&P
Neurology Consult  7/15/2018     HPI: This is a 58y.o. -year-old male with history of leukemia in remission since 2007. Presents with nausea vomiting and diplopia. MRI reviewed preliminarily and negative. He describes recurrent episodes of visual disturbance, with the appearance of objects in his peripheral vision moving while his central vision is clear. He describes it as \"kaleidoscope vision. \"  He has had throbbing headaches in the past. He has been under work-related stress recently. He awoke Fri night to use the bathroom. He noted imbalance and double vision then. By the next morning double vision continued intermittently, sometimes vertical, sometimes horizontal.  He came to the hospital for evaluation. While here, he developed a brief episode of vomiting. Currently his nausea has passed. He denies vertigo but has chronic hearing loss. He continues to report intermittent diplopia lasting a second or two. PMH:     No past medical history on file. SH: There is no history of substance abuse. FH: is negative for inherited neurologic diseases. ROS: is negative for focal numbness , weakness, tingling, dysarthria, dysphagia, dysphonia, dyspnea, diplopia or other neurologic symptoms except as stated above. There is no history of fever, chills, sweats, weight loss lymphadenopathy, anemia or rash. The patient denies hemoptysis, hematemesis, hematuria or hematochezia. PE: on physical examination, the general examination revealed no significant skin lesions. There were no palpable nodes. The thyroid was not not enlarged. There are no carotid or vertebral bruits. The chest is clear to auscultation and percussion. Examination of the heart revealed S1 S2 without murmur or gallop. The abdomen soft nontender with normally active bowel sounds. There is no hepatosplenomegaly or mass. The extremities were unremarkable. There was no clubbing, cyanosis or edema.  N    eurologically, the patient was alert and conversant. Visual fields were intact to confrontation. Pupils are reactive from 4 mm to 2 mm bilaterally. Funduscopic examination revealed flat disks and normal vasculature bilaterally. Eye movements were full and conjugate, saccades were accurate, pursuit movements were smooth, and there was mixed linear-rotatory  Nystagmus in all directions of gaze, but not looking straight ahead. With cover/uncover there is bilateral exodeviation. Vestibulocular reflexes are abnormal.  Red lens testing was non-contributary, with intermittent diplopia in all fields. Facial strength and sensation were intact. The palate and tongue moved in the midline. Sternomastoid and trapezius muscles were strong. Motor examination revealed normal tone and bulk in the arms, increased tone in the legs. Tendon reflexes were 2+ and symmetric. Plantar responses were flexor. Strength tested as 5/5 in all muscle groups. There was no pronator drift. Finger taps were dewitt and symmetric. Finger-to-nose and heel-to-shin testing were normal. The gait was wide based, with mild difficulty on  tandem testing. .     Findings:  Mixed linear/rotatory nystagmus  Abnormal vestibuloocular reflexes  Abnormal cover/uncover testing    Impression:    Imbalance is likely vestibular based on the character of the patient's nystagmus and his abnormal vestibuloocular reflex. These suggest peripheral vertigo as the cause of the patient's imbalance. His eye findings suggest a manifest phorion, I.e. A latent weakness of fusion, usually congenital, that becomes symptomatic in the setting of some other disturbance, such as peripheral vertigo. Plan:     If radiology finds no new lesion, would discharge for neuro/ENT followup. Left card for pt to make appt. Continue statin, start daily aspirin. May be a candidate for preventive therapy for migraine.

## 2018-07-15 NOTE — PROGRESS NOTES
Care Management Interventions  PCP Verified by CM: Yes  Palliative Care Criteria Met (RRAT>21 & CHF Dx)?: No  Mode of Transport at Discharge:  Other (see comment)  Transition of Care Consult (CM Consult): Discharge Planning  Discharge Durable Medical Equipment: No  Physical Therapy Consult: Yes  Occupational Therapy Consult: Yes  Speech Therapy Consult: Yes  Current Support Network: Lives with Spouse  Confirm Follow Up Transport: Family  Plan discussed with Pt/Family/Caregiver: Yes  Discharge Location  Discharge Placement: Home

## 2018-07-15 NOTE — PROGRESS NOTES
Problem: Mobility Impaired (Adult and Pediatric)  Goal: *Acute Goals and Plan of Care (Insert Text)  Physical Therapy Goals  Initiated 7/15/2018 and to be accomplished within 7 day(s)  1. Patient will ambulate with modified independence for 300 feet with the least restrictive device. 2.  Patient will ascend/descend 2 stairs without handrail(s) with modified independence. Outcome: Progressing Towards Goal  PHYSICAL THERAPY: Initial Assessment   OBSERVATION: [employer]: Hospital Day: 2     Patient: Rayne Pineda (29 y.o. male)    Date: 7/15/2018  Primary Diagnosis: Binocular vision disorder with diplopia    ,     Precautions:   Fall      PLOF: I with ADLs and ambulation     ASSESSMENT :  Patient supine in bed, agreeable to participation with PT. Wife present. Patient reports that he lives in a 2 story home but does not access the 2nd floor often. 2 BLOSSOM without HR and ambulates with no AD. Admits that he would benefit from a [facility] when double vision occurs to improve his steadiness with gait. No current double vision. MOD I for supine <> sit and sit <> stand. Supervision for ambulation x 10 feet without AD and within room environment; no notable gait deviations. Patient reports feeling nauseated. Returned to bed and positioned for comfort with all needs within reach. Patient has no c/o pain otherwise. Patient educated on safety with mobility, use of SPC for safety during episodes of double vision, and PT plan of care. Recommend d/c home with outpatient PT services to maximize safety with mobility to prevent falls. Patient presents with deficits in:  Gait and Stairs    Patient will benefit from skilled intervention to address the above impairments.   Patients rehabilitation potential is considered to be Fair  Factors which may influence rehabilitation potential include:   []         None noted  []         Mental ability/status  [x]         Medical condition  []         Home/family situation and support systems  []         Safety awareness  []         Pain tolerance/management  []         Other:      PLAN :  Recommendations and Planned Interventions:  []           Bed Mobility Training             [x]    Neuromuscular Re-Education  []           Transfer Training                   []    Orthotic/Prosthetic Training  [x]           Gait Training                          []    Modalities  [x]           Therapeutic Exercises          []    Edema Management/Control  [x]           Therapeutic Activities            [x]    Patient and Family Training/Education  []           Other (comment):      EDUCATION:   Education:  Patient was educated on the following topics: Safety with mobility, use of SPC for stability with gait, and PT plan of care. Barriers to Learning/Limitations: None  Compensate with: visual, verbal, tactile, kinesthetic cues/model    Recommendations for the next treatment session: Gait and stair training. Frequency/Duration: Patient will be followed by physical therapy 1-2 times per day/4-7 days per week to address goals. Discharge Recommendations: Outpatient  Further Equipment Recommendations for Discharge: straight cane  Factors which may impact discharge planning: Medical condition      SUBJECTIVE:   Patient stated I'm ready to go home.     OBJECTIVE DATA SUMMARY:   No past medical history on file. No past surgical history on file. Eval Complexity: History: MEDIUM  Complexity : 1-2 comorbidities / personal factors will impact the outcome/ POC Exam:MEDIUM Complexity : 3 Standardized tests and measures addressing body structure, function, activity limitation and / or participation in recreation  Presentation: MEDIUM Complexity : Evolving with changing characteristics  Clinical Decision Making:Medium Complexity [facility] Standing Balance Scale 4+/5 Overall Complexity:MEDIUM    G CODES:Mobility [id number] Current  CI= 1-19%   Goal  CI= 1-19%.   The severity rating is based on the Other [address] Standing Balance Scale 4+/5    [employer] Balance Scale 4+/5  0: Pt performs 25% or less of standing activity (Max assist) CN, 100% impaired. 1: Pt supports self with upper extremities but requires therapist assistance. Pt performs 25-50% of effort (Mod assist) CM, 80% to <100% impaired. 1+: Pt supports self with upper extremities but requires therapist assistance. Pt performs >50% effort. (Min assist). CL, 60% to <80% impaired. 2: Pt supports self independently with both upper extremities (walker, crutches, parallel bars). CL, 60% to <80% impaired. 2+: Pt support self independently with 1 upper extremity (cane, crutch, 1 parallel bar). CK, 40% to <60% impaired. 3: Pt stands without upper extremity support for up to 30 seconds. CK, 40% to <60% impaired. 3+: Pt stands without upper extremity support for 30 seconds or greater. CJ, 20% to <40% impaired. 4: Pt independently moves and returns center of gravity 1-2 inches in one plane. CJ, 20% to <40% impaired. 4+: Pt independently moves and returns center of gravity 1-2 inches in multiple planes. CI, 1% to <20% impaired. 5: Pt independently moves and returns center of gravity in all planes greater than 2 inches. CH, 0% impaired. Prior Level of Function/Home Situation:   Home Situation  Home Environment: Private residence  # Steps to Enter: 2  Rails to Enter: No  One/Two Story Residence: Two story, live on 1st floor  Living Alone: No  Support Systems: Spouse/Significant Other/Partner, Family member(s)  Patient Expects to be Discharged to[de-identified] Private residence  Current DME Used/Available at Home: None  Critical Behavior:  Neurologic State: Alert  Orientation Level: Oriented X4  Cognition: Follows commands; Appropriate decision making  Safety/Judgement: Fall prevention; Awareness of environment  Psychosocial  Patient Behaviors: Calm; Cooperative  Purposeful Interaction: Yes    Manual Muscle Testing (LE)         R     L    Hip Flexion:   5/5 5/5  Knee EXT:   5/5 5/5  Knee FLEX:   5/5 5/5  Ankle DF:   5/5 5/5  _________________________________________________   Tone : normal  Sensation: NT  Range Of Motion: WFL    Functional Mobility:      Functional Status      Indep   (I)   Mod I   Super-vision   Min A   Mod A   Max A   Total A   Assist x2 Verbal cues Additional time Not tested   Comments   Rolling []  []  [] []    []    []  []  [] [] [] [x]    Supine to sit []  [x]  [] []  []  []  []  [] [] [] []    Sit to supine []  [x]  [] []  []  []  []  [] [] [] []    Sit to stand []  [x]  [] []  []  []  []  [] [] [] []    Stand to sit []  [x]  [] []  []  []  []  [] [] [] []    Bed to chair transfers []  []  [] []  []  []  []  [] [] [] [x]        Balance    Good   Fair   Poor   Unable   Not tested   Comments   Sitting static [x]  []  []  []  []    Sitting dynamic [x]  []  []  []  []    Standing static [x]  []  []  []  []    Standing dynamic [x]  []  []  []  []        Mobility/Gait:   Level of Assistance: Supervision  Assistive Device: None  Distance Ambulated: 10 feet     Left Lower Extremity: FWB  Right Lower Extremity: FWB  Base of Support: WFL  Speed/Kaya: WFL  Step Length: WFL  Swing Pattern: WFL  Stance: WFL  Gait Abnormalities: WFL    Pain: None    Vital Signs  Temp: 97.9 °F (36.6 °C)     Pulse (Heart Rate): 69     BP: 144/76     Resp Rate: 16     O2 Sat (%): 97 %    Activity Tolerance:   Fair, limited by nausea    Please refer to the flowsheet for vital signs taken during this treatment. After treatment:   []         Patient left in no apparent distress sitting up in chair  [x]         Patient left in no apparent distress in bed  [x]         Call bell left within reach  []         Nursing notified  []         Caregiver present  []         Bed alarm activated    COMMUNICATION/EDUCATION:   [x]         Fall prevention education was provided and the patient/caregiver indicated understanding.   [x]         Patient/family have participated as able in goal setting and plan of care. [x]         Patient/family agree to work toward stated goals and plan of care. []         Patient understands intent and goals of therapy, but is neutral about his/her participation. []         Patient is unable to participate in goal setting and plan of care.     Thank you for this referral.  Aliza Meier   Time Calculation: 21 mins

## 2018-07-15 NOTE — PROGRESS NOTES
1026: PT orders received and chart reviewed. Attempted PT evaluation, neurology in room with patient. Will f/u patient.      Nevaeh Rvias PT, DPT  Office extension: [phone]  Pager #: [phone]

## 2018-07-15 NOTE — PROGRESS NOTES
Reason for Admission:   Blurred vision                   RRAT Score:         3            Plan for utilizing home health:      no                    Likelihood of Readmission:  Low/green                         Transition of Care Plan:    Spoke with pt, lives with spouse, designates her for dcp. Employed, insured. Wife to transport home. No dme in home. pcp dr [place], saw 1 mo ago. Demographics correct. obs letter given, copy to chart. Plan home. therapy evals pend        Patient has designated _______spouse_________________ to participate in his/her discharge plan and to receive any needed information. Name: Alexus rogel  Address:  Phone number:[phone]    Patient and/or next of kin has been given the Outpatient Observation Information and Notification letter and all questions answered.

## 2018-07-15 NOTE — PROGRESS NOTES
Problem: Falls - Risk of  Goal: *Absence of Falls  Document Carley Fall Risk and appropriate interventions in the flowsheet.    Outcome: Progressing Towards Goal  Fall Risk Interventions:                      History of Falls Interventions: Door open when patient unattended

## 2018-07-15 NOTE — DISCHARGE SUMMARY
[address] [facility]  Hospitalist Division    Discharge Summary    Patient: Ana Palma MRN: [id number]  CSN: [id number]    YOB: 1955  Age: 58 y.o. Sex: male    DOA: 7/14/2018 LOS:  LOS: 0 days   Discharge Date: 7/15/2018     Admission Diagnoses: Binocular vision disorder with diplopia    Discharge Diagnoses:    Problem List as of 7/15/2018  Never Reviewed          Codes Class Noted - Resolved    History of acute myeloid leukemia in remission ICD-10-CM: Z85.6  ICD-9-CM: V10.62  7/14/2018 - Present        * (Principal)Binocular vision disorder with diplopia ICD-10-CM: H53.2  ICD-9-CM: 368.2  7/14/2018 - Present        Hypertension ICD-10-CM: I10  ICD-9-CM: 401.9  7/14/2018 - Present        Hyperlipidemia ICD-10-CM: E78.5  ICD-9-CM: 272.4  7/14/2018 - Present              Discharge Condition: Stable    Discharge To: Home    Consults: Hospitalist and Neurology    Hospital Course: Ana Palma is a 58 y.o. male with a PMHx of acute promyelocytic leukemia now in remission, HTN, HLD who presented to the ED with the acute onset of horizontal diplopia w/ associated nausea, vomiting and dizziness. The patient stated he woke up to the symptoms this morning at around midnight when he got up to go to the bathroom. This was followed by dizziness. He ended up trying to lie down most of the day but the symptoms persisted long enough to lead him to the ED. He also reported a history of Kaleidoscope vision that has been progressive over the last few years. In the ED, a code S was called and CT head was completed. Results were negative. Tele-neurology was consulted and recommended at least observation for continued work up. On the way back from CT he became nauseous and vomited. He stated that the diplopia would disappear when either eye was closed. He denied any ocular pain. Currently, he is feeling OK and no longer having symptoms, but he admitted that it has waxed and waned.  He denied any history of stroke. Discussed CTA head/neck and MRI Brain with Radiologist on-call: no large vessel disease noted. No acute infarct noted. Full dictation of results to follow. Patient admits to being non-compliant with antihypertensive and statin regimen. Compliance discussed with patient and spouse. Neurology recommended continuing asa on discharge. Patient to follow up as listed below. Physical Exam:  General appearance: alert, cooperative, no distress, appears stated age  Lungs: clear to auscultation bilaterally  Heart: regular rate and rhythm, S1, S2 normal, no murmur, click, rub or gallop  Abdomen: soft, non-tender. Bowel sounds normal. No masses,  no organomegaly  Extremities: extremities normal, atraumatic, no cyanosis or edema  Skin: Skin color, texture, turgor normal. No rashes or lesions  Neurologic: Grossly normal  PSY: Mood and affect normal, appropriately behaved    Significant Diagnostic Studies:   Recent Results (from the past 24 hour(s))   CBC W/O DIFF    Collection Time: 07/14/18  4:45 PM   Result Value Ref Range    WBC 9.6 4.6 - 13.2 K/uL    RBC 5.43 4.70 - 5.50 M/uL    HGB 16.0 13.0 - 16.0 g/dL    HCT 45.6 36.0 - 48.0 %    MCV 84.0 74.0 - 97.0 FL    MCH 29.5 24.0 - 34.0 PG    MCHC 35.1 31.0 - 37.0 g/dL    RDW 12.6 11.6 - 14.5 %    PLATELET 250 982 - 853 K/uL    MPV 9.3 9.2 - 69.3 FL   METABOLIC PANEL, COMPREHENSIVE    Collection Time: 07/14/18  4:45 PM   Result Value Ref Range    Sodium 138 136 - 145 mmol/L    Potassium 3.5 3.5 - 5.5 mmol/L    Chloride 105 100 - 108 mmol/L    CO2 24 21 - 32 mmol/L    Anion gap 9 3.0 - 18 mmol/L    Glucose 116 (H) 74 - 99 mg/dL    BUN 18 7.0 - 18 MG/DL    Creatinine 0.93 0.6 - 1.3 MG/DL    BUN/Creatinine ratio 19 12 - 20      GFR est AA >60 >60 ml/min/1.73m2    GFR est non-AA >60 >60 ml/min/1.73m2    Calcium 8.7 8.5 - 10.1 MG/DL    Bilirubin, total 0.6 0.2 - 1.0 MG/DL    ALT (SGPT) 25 16 - 61 U/L    AST (SGOT) 17 15 - 37 U/L    Alk.  phosphatase 54 45 - 117 U/L Protein, total 7.6 6.4 - 8.2 g/dL    Albumin 3.9 3.4 - 5.0 g/dL    Globulin 3.7 2.0 - 4.0 g/dL    A-G Ratio 1.1 0.8 - 1.7     PROTHROMBIN TIME + INR    Collection Time: 07/14/18  4:45 PM   Result Value Ref Range    Prothrombin time 12.5 11.5 - 15.2 sec    INR 1.0 0.8 - 1.2     THROMBIN TIME    Collection Time: 07/14/18  4:45 PM   Result Value Ref Range    Thrombin time 15.4 13.8 - 18.2 SECS   FIBRINOGEN    Collection Time: 07/14/18  4:45 PM   Result Value Ref Range    Fibrinogen 405 210 - 451 mg/dL   TYPE & SCREEN    Collection Time: 07/14/18  4:45 PM   Result Value Ref Range    Crossmatch Expiration 07/17/2018     ABO/Rh(D) A POSITIVE     Antibody screen NEG    ASPIRIN TEST    Collection Time: 07/14/18  4:45 PM   Result Value Ref Range    Aspirin test 627 620 - 672 ARU   HEMOGLOBIN A1C WITH EAG    Collection Time: 07/14/18  4:45 PM   Result Value Ref Range    Hemoglobin A1c 5.6 4.2 - 5.6 %    Est. average glucose 114 mg/dL   SED RATE (ESR)    Collection Time: 07/14/18  4:45 PM   Result Value Ref Range    Sed rate, automated 13 0 - 20 mm/hr   TSH 3RD GENERATION    Collection Time: 07/14/18  4:45 PM   Result Value Ref Range    TSH 0.66 0.36 - 3.74 uIU/mL   T3, FREE    Collection Time: 07/14/18  4:45 PM   Result Value Ref Range    Triiodothyronine (T3), free 2.6 2.18 - 3.98 PG/ML   T4, FREE    Collection Time: 07/14/18  4:45 PM   Result Value Ref Range    T4, Free 1.0 0.7 - 1.5 NG/DL   LIPID PANEL    Collection Time: 07/14/18  4:45 PM   Result Value Ref Range    LIPID PROFILE          Cholesterol, total 312 (H) <200 MG/DL    Triglyceride 164 (H) <150 MG/DL    HDL Cholesterol 67 (H) 40 - 60 MG/DL    LDL, calculated 212.2 (H) 0 - 100 MG/DL    VLDL, calculated 32.8 MG/DL    CHOL/HDL Ratio 4.7 0 - 5.0     EKG, 12 LEAD, INITIAL    Collection Time: 07/14/18  5:09 PM   Result Value Ref Range    Ventricular Rate 74 BPM    Atrial Rate 74 BPM    P-R Interval 176 ms    QRS Duration 96 ms    Q-T Interval 402 ms    QTC Calculation (Bezet) 446 ms    Calculated P Axis 47 degrees    Calculated R Axis 24 degrees    Calculated T Axis 6 degrees    Diagnosis       Normal sinus rhythm  Normal ECG  No previous ECGs available  Confirmed by Sugar Loredo ([id number]) on 7/15/2018 11:05:18 AM     GLUCOSE, POC    Collection Time: 07/14/18  5:23 PM   Result Value Ref Range    Glucose (POC) 121 (H) 70 - 110 mg/dL   URINALYSIS W/ RFLX MICROSCOPIC    Collection Time: 07/14/18  7:05 PM   Result Value Ref Range    Color YELLOW      Appearance CLEAR      Specific gravity >1.030 (H) 1.005 - 1.030    pH (UA) 7.5 5.0 - 8.0      Protein NEGATIVE  NEG mg/dL    Glucose NEGATIVE  NEG mg/dL    Ketone 15 (A) NEG mg/dL    Bilirubin NEGATIVE  NEG      Blood NEGATIVE  NEG      Urobilinogen 1.0 0.2 - 1.0 EU/dL    Nitrites NEGATIVE  NEG      Leukocyte Esterase NEGATIVE  NEG     DRUG SCREEN, URINE    Collection Time: 07/14/18  7:05 PM   Result Value Ref Range    BENZODIAZEPINES NEGATIVE  NEG      BARBITURATES NEGATIVE  NEG      THC (TH-CANNABINOL) NEGATIVE  NEG      OPIATES NEGATIVE  NEG      PCP(PHENCYCLIDINE) NEGATIVE  NEG      COCAINE NEGATIVE  NEG      AMPHETAMINES NEGATIVE  NEG      METHADONE NEGATIVE  NEG      HDSCOM (NOTE)    GLUCOSE, POC    Collection Time: 07/14/18 10:16 PM   Result Value Ref Range    Glucose (POC) 153 (H) 70 - 110 mg/dL   GLUCOSE, POC    Collection Time: 07/15/18 12:38 AM   Result Value Ref Range    Glucose (POC) 133 (H) 70 - 110 mg/dL   GLUCOSE, POC    Collection Time: 07/15/18  7:31 AM   Result Value Ref Range    Glucose (POC) 104 70 - 110 mg/dL   GLUCOSE, POC    Collection Time: 07/15/18 11:16 AM   Result Value Ref Range    Glucose (POC) 120 (H) 70 - 110 mg/dL         Discharge Medications:     Current Discharge Medication List      START taking these medications    Details   aspirin (ASPIRIN) 325 mg tablet Take 1 Tab by mouth daily.   Qty: 30 Tab, Refills: 0         CONTINUE these medications which have CHANGED    Details   atorvastatin (LIPITOR) 80 mg tablet Take 1 Tab by mouth nightly. Qty: 30 Tab, Refills: 0         CONTINUE these medications which have NOT CHANGED    Details   lisinopril (PRINIVIL, ZESTRIL) 10 mg tablet Take  by mouth daily.  Indications: Pt and wife do not know dose             Activity: Activity as tolerated    Diet: Cardiac Diet    Wound Care: None needed    Follow-up:   PCP within 3-5 days   Neurology as outpatient- patient to schedule   Opthomology as outpatient- patient to schedule    Discharge time: > 35 minutes   John Pruett NP  7/15/2018, 12:05 PM

## 2018-07-15 NOTE — PROGRESS NOTES
Speech Therapy Note:    On-call SLP acknowledging evaluate and tx orders. SLP spoke with JOSE La; it is deemed that a stat swallow evaluation is not indicated at this time d/t tolerating cardiac regular diet. SLP will follow up next business day as indicated. Jurgen SNIDER.  [id number] [facility]  Speech-Language Pathologist

## 2018-07-15 NOTE — PROGRESS NOTES
[address] July 15, 2018 RE: Tiffanie Rosenbaum To Whom It May Concern, This is to certify that Tiffanie Rosenbaum may may return to work on 7/16/2018. Please feel free to contact my office if you have any questions or concerns. Thank you for your assistance in this matter. Sincerely, 
 
 
Tommy Uribe NP 
[phone]

## 2018-07-15 NOTE — PROGRESS NOTES
Assumed care of patient from Amanda Olar, [address] [place] [address] (offgoing nurse). Patient awake in bed, a&ox4, with no complaints at this time. Dual NIH completed. Bed locked and in lowest position with call bell and frequently used items in reach. 8884- Discharge instructions reviewed with patient and spouse. Voiced understanding. Asked if it is okay to return to work tomorrow, patient drives 24+ minutes to work. Paged Mathieu Patel NP to see if patient is ok to return to work tomorrow. [phone]- Paged Mathieu Patel regarding above. [phone] Patient discharge home accompanied by CNA via wheelchair to front entrance to car. No distress noted. Patient has discharge instructions along with belongings. Voiced understanding of discharge instructions.

## 2018-07-15 NOTE — DISCHARGE INSTRUCTIONS
DISCHARGE SUMMARY from Nurse    PATIENT INSTRUCTIONS:    After general anesthesia or intravenous sedation, for 24 hours or while taking prescription Narcotics:  · Limit your activities  · Do not drive and operate hazardous machinery  · Do not make important personal or business decisions  · Do  not drink alcoholic beverages  · If you have not urinated within 8 hours after discharge, please contact your surgeon on call. Report the following to your surgeon:  · Excessive pain, swelling, redness or odor of or around the surgical area  · Temperature over 100.5  · Nausea and vomiting lasting longer than 4 hours or if unable to take medications  · Any signs of decreased circulation or nerve impairment to extremity: change in color, persistent  numbness, tingling, coldness or increase pain  · Any questions    What to do at Home:  Recommended activity: Activity as tolerated    If you experience any of the following symptoms listed under Signs and Symptoms of a Stroke or Warning Signs of a Heart Attack, please follow up with your PCP and/or call 911. *  Please give a list of your current medications to your Primary Care Provider. *  Please update this list whenever your medications are discontinued, doses are      changed, or new medications (including over-the-counter products) are added. *  Please carry medication information at all times in case of emergency situations. These are general instructions for a healthy lifestyle:    No smoking/ No tobacco products/ Avoid exposure to second hand smoke  Surgeon General's Warning:  Quitting smoking now greatly reduces serious risk to your health.     Obesity, smoking, and sedentary lifestyle greatly increases your risk for illness    A healthy diet, regular physical exercise & weight monitoring are important for maintaining a healthy lifestyle    You may be retaining fluid if you have a history of heart failure or if you experience any of the following symptoms: Weight gain of 3 pounds or more overnight or 5 pounds in a week, increased swelling in our hands or feet or shortness of breath while lying flat in bed. Please call your doctor as soon as you notice any of these symptoms; do not wait until your next office visit. Recognize signs and symptoms of STROKE:    F-face looks uneven    A-arms unable to move or move unevenly    S-speech slurred or non-existent    T-time-call 911 as soon as signs and symptoms begin-DO NOT go       Back to bed or wait to see if you get better-TIME IS BRAIN. Warning Signs of HEART ATTACK     Call 911 if you have these symptoms:   Chest discomfort. Most heart attacks involve discomfort in the center of the chest that lasts more than a few minutes, or that goes away and comes back. It can feel like uncomfortable pressure, squeezing, fullness, or pain.  Discomfort in other areas of the upper body. Symptoms can include pain or discomfort in one or both arms, the back, neck, jaw, or stomach.  Shortness of breath with or without chest discomfort.  Other signs may include breaking out in a cold sweat, nausea, or lightheadedness. Don't wait more than five minutes to call 911 - MINUTES MATTER! Fast action can save your life. Calling 911 is almost always the fastest way to get lifesaving treatment. Emergency Medical Services staff can begin treatment when they arrive -- up to an hour sooner than if someone gets to the hospital by car. The discharge information has been reviewed with the patient. The patient verbalized understanding. Discharge medications reviewed with the patient and appropriate educational materials and side effects teaching were provided. ___________________________________________________________________________________________________________________________________  Double Vision: Care Instructions  Your Care Instructions  Double vision means seeing two images instead of one.  To see normally, your eyes, the muscles that move them, the nerves that send images to your brain, and your brain all have to work together. A problem with any of these parts can cause double vision. Double vision can occur in one or both eyes. It can be horizontal (so the images appear side by side) or vertical (so one image appears above the other). Double vision may be caused by a muscle or nerve problem. Or it may be caused by an eye problem such as a cataract or by a brain problem such as a stroke. When the cause is found, double vision can usually be corrected. To find the cause, you may need tests. These may include blood tests and imaging tests such as MRI. You may need to follow up with an eye doctor or a brain specialist (neurologist) for more testing or treatment. The doctor has checked you carefully, but problems can develop later. If you notice any problems or new symptoms, get medical treatment right away. Follow-up care is a key part of your treatment and safety. Be sure to make and go to all appointments, and call your doctor if you are having problems. It's also a good idea to know your test results and keep a list of the medicines you take. How can you care for yourself at home? · Do not drive or do other things that could be dangerous because of your double vision. · Make your home safe to help prevent injuries. For example, remove throw rugs and electrical cords that could cause falls. Be extra careful when you work with sharp tools or knives. · Ask another adult to stay with you until your vision gets better. When should you call for help? Call 911 anytime you think you may need emergency care. For example, call if:  · You have symptoms of a stroke. These may include:  ¨ Sudden numbness, tingling, weakness, or loss of movement in your face, arm, or leg, especially on only one side of your body. ¨ Sudden vision changes. ¨ Sudden trouble speaking.   ¨ Sudden confusion or trouble understanding simple statements. ¨ Sudden problems with walking or balance. ¨ A sudden, severe headache that is different from past headaches. Call your doctor now or seek immediate medical care if:  · You have new or worse eye pain. · You have new or worse redness in your eye. · Light hurts your eye. Watch closely for changes in your health, and be sure to contact your doctor if:  · You do not get better as expected. Where can you learn more? Go to [facility]  Enter S401 in the search box to learn more about \"Double Vision: Care Instructions. \"   © 6786-5932 [employer]. Care instructions adapted under license by [employer] (which disclaims liability or warranty for this information). This care instruction is for use with your licensed healthcare professional. If you have questions about a medical condition or this instruction, always ask your healthcare professional. [address] any warranty or liability for your use of this information.   Content Version: 11.5.445092; Current as of: August 21, 2015    Patient armband removed and shredded

## 2018-07-15 NOTE — PROGRESS NOTES
2000 - Assumed pt care from [facility]. Pt in the stretcher, transferred safely to bed. Alert and oriented x 4. Not in any form of distress. Denies pain. Frequent use items and call bell within reach. Verbalized understanding to call for assistance. Bed locked in lowest position. Family at bedside. 2216 - BG - 153. Pt has no history of diabetes/sliding scale. 2237 - Paged Dr. Carmina Aguillon. Dr. Carmina Aguillon returned call. Notified that pt has BG of 153 and has no history of Diabetes. Order received to recheck BG in 2 hrs.     0743 - Bedside and Verbal shift change report given to Mary Landry RN (oncoming nurse) by Mejia Rivera RN (offgoing nurse). Report included the following information SBAR, Kardex, Intake/Output, MAR and Recent Results. Dual NIHSS completed.

## 2018-07-19 LAB — CRP SERPL HS-MCNC: 2.46 MG/L (ref 0–3)
